# Patient Record
Sex: MALE | Race: WHITE | HISPANIC OR LATINO | URBAN - METROPOLITAN AREA
[De-identification: names, ages, dates, MRNs, and addresses within clinical notes are randomized per-mention and may not be internally consistent; named-entity substitution may affect disease eponyms.]

---

## 2025-02-05 ENCOUNTER — APPOINTMENT (OUTPATIENT)
Dept: URBAN - METROPOLITAN AREA CLINIC 61 | Facility: CLINIC | Age: 30
Setting detail: DERMATOLOGY
End: 2025-02-05

## 2025-02-05 DIAGNOSIS — L20.89 OTHER ATOPIC DERMATITIS: ICD-10-CM

## 2025-02-05 DIAGNOSIS — B00.1 HERPESVIRAL VESICULAR DERMATITIS: ICD-10-CM

## 2025-02-05 PROCEDURE — ? COUNSELING

## 2025-02-05 PROCEDURE — 99214 OFFICE O/P EST MOD 30 MIN: CPT

## 2025-02-05 PROCEDURE — ? PRESCRIPTION

## 2025-02-05 PROCEDURE — ? PRESCRIPTION MEDICATION MANAGEMENT

## 2025-02-05 PROCEDURE — ? DUPIXENT INITIATION

## 2025-02-05 RX ORDER — DUPILUMAB 300 MG/2ML
INJECTION, SOLUTION SUBCUTANEOUS
Qty: 4 | Refills: 0 | Status: CANCELLED
Stop reason: SENT

## 2025-02-05 RX ORDER — VALACYCLOVIR 1 G/1
TABLET, FILM COATED ORAL
Qty: 8 | Refills: 1 | Status: ERX | COMMUNITY
Start: 2025-02-05

## 2025-02-05 RX ADMIN — VALACYCLOVIR: 1 TABLET, FILM COATED ORAL at 00:00

## 2025-02-05 ASSESSMENT — LOCATION DETAILED DESCRIPTION DERM
LOCATION DETAILED: PERIUMBILICAL SKIN
LOCATION DETAILED: RIGHT SUPERIOR MEDIAL UPPER BACK
LOCATION DETAILED: RIGHT POPLITEAL SKIN
LOCATION DETAILED: RIGHT ANKLE
LOCATION DETAILED: RIGHT RADIAL PALM
LOCATION DETAILED: LEFT ULNAR DORSAL HAND
LOCATION DETAILED: LEFT PROXIMAL RADIAL THUMB
LOCATION DETAILED: LEFT POPLITEAL SKIN
LOCATION DETAILED: LEFT LATERAL ABDOMEN
LOCATION DETAILED: LEFT DORSAL FOOT
LOCATION DETAILED: RIGHT RADIAL DORSAL HAND

## 2025-02-05 ASSESSMENT — LOCATION SIMPLE DESCRIPTION DERM
LOCATION SIMPLE: LEFT THUMB
LOCATION SIMPLE: RIGHT ANKLE
LOCATION SIMPLE: LEFT POPLITEAL SKIN
LOCATION SIMPLE: LEFT HAND
LOCATION SIMPLE: ABDOMEN
LOCATION SIMPLE: RIGHT POPLITEAL SKIN
LOCATION SIMPLE: RIGHT HAND
LOCATION SIMPLE: LEFT FOOT
LOCATION SIMPLE: RIGHT UPPER BACK

## 2025-02-05 ASSESSMENT — LOCATION ZONE DERM
LOCATION ZONE: HAND
LOCATION ZONE: TRUNK
LOCATION ZONE: FINGER
LOCATION ZONE: FEET
LOCATION ZONE: LEG

## 2025-02-05 ASSESSMENT — BSA ECZEMA: % BODY COVERED IN ECZEMA: 30

## 2025-02-05 ASSESSMENT — ITCH NUMERIC RATING SCALE: HOW SEVERE IS YOUR ITCHING?: 8

## 2025-02-05 NOTE — PROCEDURE: COUNSELING
Detail Level: Detailed
Patient Specific Counseling (Will Not Stick From Patient To Patient): Jed presents today with a longstanding history of atopic dermatitis that primarily affected his hands and feet but more recently is spreading throughout his trunk and extremities.  He notes that today is a good day for his disease as he is just coming off his second 2-week taper of prednisone.  He finished his last dosage of prednisone 2 days ago and is already started to notice pruritus on his abdomen coming back.  On exam today he has moderate to severe eczematous patches noted over his trunk, popliteal fossa as well as vesicular eruptions noted over his hands and feet.  There is residual postinflammatory hyperpigmentation where prior eczematous patch is were over his feet as well.  He reports that he has been using triamcinolone 0.1% cream without any significant improvement.  He has also previously been tried on clobetasol, Elidel and tacrolimus.  He works on [employer] and does not like the greasiness of the ointment on his skin as he finds it difficult to perform his work duties.  I discussed with Jed that based off of the severity of his disease I would recommend proceeding with systemic management.  We discussed it would not be safe to continue on prednisone long-term and discussed the option of Dupixent.  Side effects and dosing schedule as well as expected treatment response discussed.  He wishes to proceed.  Will see if we can get insurance prior authorization to initiate Dupixent 600 mg subcu x 1 then 300 mg every 2 weeks thereafter. He denies any hx of conjuctiviits. He reports getting approximately one HSV outbreak annually. I will send in for Valtrex to have on hand and we will closely monitor amount of flares.   I will plan to see him back 6 weeks after initiation to see his response.  In the interim encouraged to continue with emollients and topical treatments as needed.  Will try to keep him off prednisone and hopefully be able to get [place] on board as soon as possible.  He understands and agrees.

## 2025-02-05 NOTE — PROCEDURE: PRESCRIPTION MEDICATION MANAGEMENT
Initiate Treatment: Sig: Inject 600 mg on day 1, 300 mg SC every 2 weeks starting on day 14
Render In Strict Bullet Format?: No
Detail Level: Zone

## 2025-02-05 NOTE — HPI: RASH (ECZEMA)
How Severe Is Your Eczema?: moderate
Is This A New Presentation, Or A Follow-Up?: Follow Up Eczema
Additional History: Patient went into the walk-in because his hands were so bad.Blistered and Painful. Itchy. He developed this as a kid but it has been worse the past three years. He was prescribed triamcinalone with prednisone taper. Jed feels like the prednisone was the most helpful. He does have it on his feet back of his knee caps well.

## 2025-02-10 ENCOUNTER — RX ONLY (RX ONLY)
Age: 30
End: 2025-02-10

## 2025-02-10 RX ORDER — DUPILUMAB 300 MG/2ML
INJECTION, SOLUTION SUBCUTANEOUS
Qty: 4 | Refills: 1 | Status: ERX | COMMUNITY
Start: 2025-02-10

## 2025-03-20 ENCOUNTER — APPOINTMENT (OUTPATIENT)
Dept: URBAN - METROPOLITAN AREA CLINIC 61 | Facility: CLINIC | Age: 30
Setting detail: DERMATOLOGY
End: 2025-03-20

## 2025-03-20 DIAGNOSIS — L20.89 OTHER ATOPIC DERMATITIS: ICD-10-CM

## 2025-03-20 DIAGNOSIS — B35.3 TINEA PEDIS: ICD-10-CM

## 2025-03-20 PROCEDURE — ? COUNSELING

## 2025-03-20 PROCEDURE — ? KOH PREP

## 2025-03-20 PROCEDURE — ? PRESCRIPTION MEDICATION MANAGEMENT

## 2025-03-20 PROCEDURE — ? DUPIXENT MONITORING

## 2025-03-20 PROCEDURE — 99214 OFFICE O/P EST MOD 30 MIN: CPT

## 2025-03-20 PROCEDURE — ? PRESCRIPTION

## 2025-03-20 RX ORDER — MICONAZOLE NITRATE 1 %
KIT TOPICAL
Qty: 30 | Refills: 1 | Status: ERX | COMMUNITY
Start: 2025-03-20

## 2025-03-20 RX ADMIN — MICONAZOLE NITRATE: KIT at 00:00

## 2025-03-20 ASSESSMENT — LOCATION SIMPLE DESCRIPTION DERM
LOCATION SIMPLE: RIGHT ANKLE
LOCATION SIMPLE: LEFT HAND
LOCATION SIMPLE: LEFT PLANTAR SURFACE
LOCATION SIMPLE: LEFT THUMB
LOCATION SIMPLE: RIGHT HAND
LOCATION SIMPLE: LEFT FOOT

## 2025-03-20 ASSESSMENT — LOCATION DETAILED DESCRIPTION DERM
LOCATION DETAILED: LEFT PROXIMAL RADIAL THUMB
LOCATION DETAILED: LEFT ULNAR DORSAL HAND
LOCATION DETAILED: RIGHT RADIAL PALM
LOCATION DETAILED: LEFT ARCH
LOCATION DETAILED: RIGHT ANKLE
LOCATION DETAILED: LEFT MEDIAL DORSAL FOOT
LOCATION DETAILED: LEFT DORSAL FOOT
LOCATION DETAILED: RIGHT RADIAL DORSAL HAND

## 2025-03-20 ASSESSMENT — LOCATION ZONE DERM
LOCATION ZONE: FEET
LOCATION ZONE: FEET
LOCATION ZONE: LEG
LOCATION ZONE: HAND
LOCATION ZONE: FINGER

## 2025-03-20 NOTE — PROCEDURE: COUNSELING
Detail Level: Detailed
Patient Specific Counseling (Will Not Stick From Patient To Patient): Jed has a longstanding history of atopic dermatitis.  He was recently started on Dupixent and has been on this for the last 4 weeks.  He reports overall already seeing improvement in his skin with less pruritus and scaling to his hands.  He still has pinhead vesicular eruptions to hands and feet with some background erythema but is overall pleased with the improvement in treatment response over the last month of being on Dupixent.  He also reports seeing improvement in his asthma and sinus symptoms since starting Dupixent. He continues to use a moisturizer daily and has topical steroids of clobetasol and triamcinolone to use as needed.  He denies any side effects to Dupixent and reports tolerating well.  He recently returned from a trip to [place] where he was doing a lot of walking with increased perspiration of his feet. He reports noticing increased sloughing of skin to his feet since then.  See tinea diagnosis below.  I will plan to see him back in 3 months to see his continued response or sooner if concerns.
Patient Specific Counseling (Will Not Stick From Patient To Patient): OLINDA scraping was performed to his right foot.  This specimen was evaluated by Dr. Horner, Dr. Doyle and myself.  It has more unequivocal findings but elected to proceed with covering patient for tinea with terbinafine cream to be used twice daily for 2 to 4 weeks.  If at any point symptoms worsen rather than continue to improve with combination of terbinafine, Dupixent and moisturizers Jed will contact the office for sooner evaluation.

## 2025-03-20 NOTE — PROCEDURE: KOH PREP
Detail Level: Detailed
Showing: fungal hyphal elements: positive
Cpt Desired: 
Koh Procedure Text (Tissue Harvesting Technique): A 15-blade scalpel was used to scrape the skin. The skin scrapings were placed on a glass slide, covered with a coverslip and a KOH solution was applied.
Koh Intro Text (From The.....): A KOH prep was ordered and evaluated from the

## 2025-03-20 NOTE — PROCEDURE: PRESCRIPTION MEDICATION MANAGEMENT
Continue Regimen: Dupixent 300 mg SC q 2 weeks
Initiate Treatment: Sig: Inject 600 mg on day 1, 300 mg SC every 2 weeks starting on day 14
Render In Strict Bullet Format?: No
Detail Level: Zone

## 2025-06-23 ENCOUNTER — APPOINTMENT (OUTPATIENT)
Dept: URBAN - METROPOLITAN AREA CLINIC 59 | Facility: CLINIC | Age: 30
Setting detail: DERMATOLOGY
End: 2025-06-23

## 2025-06-23 ENCOUNTER — RX ONLY (RX ONLY)
Age: 30
End: 2025-06-23

## 2025-06-23 DIAGNOSIS — B35.3 TINEA PEDIS: ICD-10-CM

## 2025-06-23 DIAGNOSIS — L20.89 OTHER ATOPIC DERMATITIS: ICD-10-CM

## 2025-06-23 PROCEDURE — ? PRESCRIPTION MEDICATION MANAGEMENT

## 2025-06-23 PROCEDURE — ? COUNSELING

## 2025-06-23 PROCEDURE — ? DUPIXENT MONITORING

## 2025-06-23 RX ORDER — MICONAZOLE NITRATE 1 %
KIT TOPICAL
Qty: 30 | Refills: 1 | Status: ERX

## 2025-06-23 RX ORDER — DUPILUMAB 300 MG/2ML
INJECTION, SOLUTION SUBCUTANEOUS
Qty: 6 | Refills: 3 | Status: ERX

## 2025-06-23 ASSESSMENT — LOCATION DETAILED DESCRIPTION DERM
LOCATION DETAILED: RIGHT RADIAL PALM
LOCATION DETAILED: RIGHT RADIAL DORSAL HAND
LOCATION DETAILED: LEFT ARCH
LOCATION DETAILED: LEFT PROXIMAL RADIAL THUMB
LOCATION DETAILED: RIGHT ANKLE
LOCATION DETAILED: LEFT DORSAL FOOT
LOCATION DETAILED: LEFT ULNAR DORSAL HAND

## 2025-06-23 ASSESSMENT — LOCATION ZONE DERM
LOCATION ZONE: FEET
LOCATION ZONE: FINGER
LOCATION ZONE: FEET
LOCATION ZONE: LEG
LOCATION ZONE: HAND

## 2025-06-23 ASSESSMENT — LOCATION SIMPLE DESCRIPTION DERM
LOCATION SIMPLE: RIGHT ANKLE
LOCATION SIMPLE: RIGHT HAND
LOCATION SIMPLE: LEFT HAND
LOCATION SIMPLE: LEFT THUMB
LOCATION SIMPLE: LEFT PLANTAR SURFACE
LOCATION SIMPLE: LEFT FOOT

## 2025-06-23 ASSESSMENT — SEVERITY ASSESSMENT 2020: SEVERITY 2020: MILD

## 2025-06-23 ASSESSMENT — BSA ECZEMA: % BODY COVERED IN ECZEMA: 3

## 2025-06-23 ASSESSMENT — ITCH NUMERIC RATING SCALE: HOW SEVERE IS YOUR ITCHING?: 1

## 2025-06-23 NOTE — PROCEDURE: PRESCRIPTION MEDICATION MANAGEMENT
ICU
MD VINNIE  ED
Continue Regimen: Dupixent 300 mg SC q 2 weeks
Render In Strict Bullet Format?: No
Detail Level: Zone
Continue Regimen: Terbinafine 1% cream twice a day for 2-4 weeks and 1 week after symptoms resolve

## 2025-06-23 NOTE — PROCEDURE: MIPS QUALITY
Quality 486: Dermatitis - Improvement In Patient-Reported Itch Severity: Itch severity assessment score is reduced by 3 or more points from the initial (index) assessment score to the follow-up visit score
Detail Level: Detailed
Quality 226: Preventive Care And Screening: Tobacco Use: Screening And Cessation Intervention: Patient screened for tobacco use, is a smoker AND received Cessation Counseling within measurement period or in the six months prior to the measurement period

## 2025-06-23 NOTE — PROCEDURE: COUNSELING
Detail Level: Detailed
Patient Specific Counseling (Will Not Stick From Patient To Patient): Jed has a longstanding history of atopic dermatitis.  He was recently started on Dupixent and has been on this for the last 4 months. He reports seeing improvement in his skin with less pruritus and scaling to his hands. He reports A miscommunication with the pharmacy and did not have his medication for approximately 2 months.  Once he contacted [facility] to verify shipment they were able to send that back out to him and get him back on track.  He has now been back on Dupixent 300 mg subcu every 2 weeks consistently for the last month and is noted improvement.  At his last visit he was noted to have tinea pedis and gave him terbinafine to use twice daily for 2 to 4 weeks.  He does not recall how long he utilize that for.  He reports seeing some improvement but not resolution of symptoms.  On exam today he still has findings consistent with tinea pedis.  Encouraged him to resume using terbinafine twice daily.  The importance of treating all shoe socks and bathing environments to prevent recurrence was also reviewed.  He was also encouraged he can use clobetasol 0.05% ointment as a spot treatment to affected areas on hands and feet twice daily as needed. He reports that he is finally able to use the fingerprint ID on his iPhone and it is able to recognize it and this is the first that is occurred in years.  For this reason he is very pleased with the response of treating not only atopic dermatitis on hands, trunk, extremities but asthma symptoms as well. I will plan to see him back in 3 months to ensure that symptoms are improved and sooner if any concerns. 
Patient Specific Counseling (Will Not Stick From Patient To Patient): At his last visit OLINDA was performed of his feet and positive for fungal hyphae.  He was given terbinafine to use twice daily but is unsure how long he utilized that.  See above documentation. I will plan on seeing him back in 3 months to see how he is responding or sooner with any concerns. He understands and agrees.